# Patient Record
Sex: FEMALE | Race: BLACK OR AFRICAN AMERICAN | NOT HISPANIC OR LATINO | Employment: UNEMPLOYED | URBAN - METROPOLITAN AREA
[De-identification: names, ages, dates, MRNs, and addresses within clinical notes are randomized per-mention and may not be internally consistent; named-entity substitution may affect disease eponyms.]

---

## 2017-04-26 ENCOUNTER — RADIANT APPOINTMENT (OUTPATIENT)
Dept: MAMMOGRAPHY | Facility: CLINIC | Age: 62
End: 2017-04-26
Attending: FAMILY MEDICINE

## 2017-04-26 ENCOUNTER — ONCOLOGY VISIT (OUTPATIENT)
Dept: ONCOLOGY | Facility: CLINIC | Age: 62
End: 2017-04-26
Attending: FAMILY MEDICINE
Payer: COMMERCIAL

## 2017-04-26 VITALS
HEART RATE: 90 BPM | HEIGHT: 68 IN | OXYGEN SATURATION: 93 % | SYSTOLIC BLOOD PRESSURE: 138 MMHG | DIASTOLIC BLOOD PRESSURE: 76 MMHG | TEMPERATURE: 98.3 F | RESPIRATION RATE: 12 BRPM | WEIGHT: 259.1 LBS | BODY MASS INDEX: 39.27 KG/M2

## 2017-04-26 DIAGNOSIS — N64.4 BREAST PAIN: ICD-10-CM

## 2017-04-26 DIAGNOSIS — Z12.39 BREAST CANCER SCREENING: ICD-10-CM

## 2017-04-26 DIAGNOSIS — N64.4 PAIN OF BOTH BREASTS: Primary | ICD-10-CM

## 2017-04-26 PROCEDURE — 99212 OFFICE O/P EST SF 10 MIN: CPT | Mod: ZF

## 2017-04-26 RX ORDER — NICOTINE POLACRILEX 2 MG
GUM BUCCAL
COMMUNITY

## 2017-04-26 RX ORDER — EPINEPHRINE 0.3 MG/.3ML
0.3 INJECTION SUBCUTANEOUS
COMMUNITY
Start: 2015-09-29

## 2017-04-26 RX ORDER — OFLOXACIN 3 MG/ML
3-5 SOLUTION/ DROPS OPHTHALMIC
COMMUNITY
Start: 2016-09-07 | End: 2017-04-26

## 2017-04-26 RX ORDER — MAGNESIUM HYDROXIDE 1200 MG/15ML
LIQUID ORAL
COMMUNITY
Start: 2017-01-03

## 2017-04-26 RX ORDER — DIPHENHYDRAMINE HCL 25 MG
25 TABLET ORAL
COMMUNITY
Start: 2017-03-03

## 2017-04-26 RX ORDER — PREDNISONE 20 MG/1
TABLET ORAL
COMMUNITY
Start: 2017-03-03

## 2017-04-26 ASSESSMENT — PAIN SCALES - GENERAL: PAINLEVEL: NO PAIN (0)

## 2017-04-26 NOTE — MR AVS SNAPSHOT
"              After Visit Summary   4/26/2017    Bethanie Arcos    MRN: [id number]           Patient Information     Date Of Birth          1955        Visit Information        Provider Department      4/26/2017 8:40 AM Maggie Verma MD [facility]        Today's Diagnoses     Pain of both breasts    -  1    Breast cancer screening           Follow-ups after your visit        Future tests that were ordered for you today     Open Future Orders        Priority Expected Expires Ordered    US Breast Bilateral Limited 1-3 Quadrants Routine  4/25/2018 4/25/2017            Who to contact     If you have questions or need follow up information about today's clinic visit or your schedule please contact [facility] directly at [phone].  Normal or non-critical lab and imaging results will be communicated to you by MyChart, letter or phone within 4 business days after the clinic has received the results. If you do not hear from us within 7 days, please contact the clinic through [employer] or phone. If you have a critical or abnormal lab result, we will notify you by phone as soon as possible.  Submit refill requests through [employer] or call your pharmacy and they will forward the refill request to us. Please allow 3 business days for your refill to be completed.          Additional Information About Your Visit        MyChart Information     [employer] lets you send messages to your doctor, view your test results, renew your prescriptions, schedule appointments and more. To sign up, go to [email or web address] . Click on \"Log in\" on the left side of the screen, which will take you to the Welcome page. Then click on \"Sign up Now\" on the right side of the page.     You will be asked to enter the access code listed below, as well as some personal information. Please follow the directions to create your username and password.     Your access code is: [email or web address]  Expires: 7/11/2017  6:30 AM   " "  Your access code will  in 90 days. If you need help or a new code, please call your [facility] clinic or [phone].        Care EveryWhere ID     This is your Care EveryWhere ID. This could be used by other organizations to access your [facility] medical records  [id number]        Your Vitals Were     Pulse Temperature Respirations Height Last Period Pulse Oximetry    90 98.3  F (36.8  C) (Oral) 12 1.721 m (5' 7.76\") 2009 93%    BMI (Body Mass Index)                   39.68 kg/m2            Blood Pressure from Last 3 Encounters:   17 138/76   09 98/66   08 102/72    Weight from Last 3 Encounters:   17 117.5 kg (259 lb 1.6 oz)   09 100.7 kg (222 lb)   08 98 kg (216 lb)              Today, you had the following     No orders found for display       Primary Care Provider Office Phone # Fax #    Jailyn Celeste [phone]       [facility] [address]  [facility] [address]        Thank you!     Thank you for choosing [facility]  for your care. Our goal is always to provide you with excellent care. Hearing back from our patients is one way we can continue to improve our services. Please take a few minutes to complete the written survey that you may receive in the mail after your visit with us. Thank you!             Your Updated Medication List - Protect others around you: Learn how to safely use, store and throw away your medicines at www.disposemymeds.org.          This list is accurate as of: 17 10:04 AM.  Always use your most recent med list.                   Brand Name Dispense Instructions for use    Ascorbic Acid 1500 MG Tbcr      Take 1,500 mg by mouth       Biotin 1 MG Caps          CALCIUM PO      2000mg daily       diphenhydrAMINE 25 MG tablet    BENADRYL     Take 25 mg by mouth       EPIPEN 2-JESSICA 0.3 MG/0.3ML injection   Generic drug:  EPINEPHrine      Inject 0.3 mg into the muscle       Iron-Folic " Acid-C-B6-B12-Zinc 150-1.25 MG Tabs          mepolizumab 100 MG injection    NUCALA     Inject 100 mg Subcutaneous       mometasone-formoterol 200-5 MCG/ACT oral inhaler    DULERA     Inhale 2 puffs into the lungs       MULTIVITAMIN/MINERAL FORMULA Tabs     30    1 TABLET DAILY       NASONEX 50 MCG/ACT spray   Generic drug:  mometasone     3 MONTHS    2 sprays each nostril Once daily for allergies       nebulizer Shonna      Use q 2 hours as needed for asthma       predniSONE 20 MG tablet    DELTASONE     Emergency plan for Nucala       ranitidine 150 MG tablet    ZANTAC     Take 300 mg by mouth       sodium chloride 0.9% (bottle) 0.9 % irrigation      MIX 15ML WITH 1 VIAL OF BUDESONIDE AND IRRIGATE TWO TIMES A DAY AND REPEAT WITH OTHER NOSTRIL       UNABLE TO FIND          vitamin D 2000 UNITS tablet      1 tab daily

## 2017-04-26 NOTE — NURSING NOTE
"Bethanie rAcos is a 61 year old female who presents for:  Chief Complaint   Patient presents with     Oncology Clinic Visit     consult for breast and mamomo         Initial Vitals:  /76  Pulse 90  Temp 98.3  F (36.8  C) (Oral)  Resp 12  Ht 1.721 m (5' 7.76\")  Wt 117.5 kg (259 lb 1.6 oz)  LMP 04/30/2009  SpO2 93%  BMI 39.68 kg/m2 Estimated body mass index is 39.68 kg/(m^2) as calculated from the following:    Height as of this encounter: 1.721 m (5' 7.76\").    Weight as of this encounter: 117.5 kg (259 lb 1.6 oz).. Body surface area is 2.37 meters squared. BP completed using cuff size: large  No Pain (0) Patient's last menstrual period was 04/30/2009. Allergies and medications reviewed.     Medications: Medication refills not needed today.  Pharmacy name entered into [employer]:    [facility] - [place], [place] - [address]  [facility] [place], [place] - [address]    Comments: pt denies pain    5 minutes for nursing intake (face to face time)   Raeann Demarco CMA        "

## 2017-04-26 NOTE — PROGRESS NOTES
Bethanie is a 61 year old female who [facility] with breast [facility]:    Last mammogram 2.2016: negative    Sensitivity/Tenderness in both breasts.  Presents for six months. No localized tenderness. Discomfort is bilateral on the underside of both breasts.   HPI   Breast Cancer Risk Profile: Age: 61 year old, Gravity 2, Parity 2.  Age at first birth 35. Menarche 13.  LMP: age 60 - polypectomy 2015 then started menopause. Some Hot flashes. Previous Breast Biopsy # 0.  Personal History of Cancer No.  Family History; Breast cancer: maternal aunt age 60's. Maternal cousins 2-3 in 40's. Ovarian cancer negative; Colon Cancer negative.   - Started Nucala five months for asthma and has gained 20 pounds  - Doesn't think the breast pain is related to the new drug  - Is a heavy coffee drinker  - Seeing a naturopath/chiropractor and on an elimination diet  ROS  General:  None  Head/eyes:  None  Ears/Nose/Throat:  None  Breast:bilateral breast tenderness.   Cardiovascular:  None  Respiratory:  None  Gastrointestinal:  None  Genitourinary:  None  Sexual Function:  None  Musculoskeletal:  None  Skin:  None  Neurological:  None  Mental Health:  None  Endocrine:  None  Past Medical History:  Past Medical History:   Diagnosis Date     Adenomatous polyp of colon 9/07    one adenomatous polyp on colonoscopy     Allergic rhinitis      ASCUS on Pap smear 12/07    neg HPV     Enlarged thyroid gland     seen on chest CT 9/07     Intramural leiomyoma of uterus 5/07    multiple uterine myomata     Moderate persistent asthma      Multiple pulmonary nodules     seen on chest CT 9/07     Ovarian cyst 12/06    right ovarian cyst, f/u U/S normal     Papanicolaou smear of cervix with low grade squamous intraepithelial lesion (LGSIL) 5/07     Vitamin D deficiency 1/09   Past Surgical History:  Past Surgical History:   Procedure Laterality Date     HC COLONOSCOPY THRU STOMA, DIAGNOSTIC  9/4/07    Dr. Edwards, [place] [facility], 1 adenoma,  "rpt 5 yrs.     HC COLP CERVIX/UPPER VAGINA W BX CERVIX  6/07    Dr. Bautista, normal     STRESS ECHO (METRO)  4/20/06    WNL     SURGICAL HISTORY OF -   2003    sinus surgery   Medications:  Current Outpatient Prescriptions   Medication Sig Dispense Refill     sodium chloride 0.9%, bottle, 0.9 % irrigation MIX 15ML WITH 1 VIAL OF BUDESONIDE AND IRRIGATE TWO TIMES A DAY AND REPEAT WITH OTHER NOSTRIL       Respiratory Therapy Supplies (NEBULIZER) BLANCA Use q 2 hours as needed for asthma       ranitidine (ZANTAC) 150 MG tablet Take 300 mg by mouth       predniSONE (DELTASONE) 20 MG tablet Emergency plan for Nucala       mometasone-formoterol (DULERA) 200-5 MCG/ACT oral inhaler Inhale 2 puffs into the lungs       mepolizumab (NUCALA) 100 MG injection Inject 100 mg Subcutaneous       UNABLE TO FIND        Iron-Folic Acid-C-B6-B12-Zinc 150-1.25 MG TABS        EPINEPHrine (EPIPEN 2-JESSICA) 0.3 MG/0.3ML injection Inject 0.3 mg into the muscle       diphenhydrAMINE (BENADRYL) 25 MG tablet Take 25 mg by mouth       Biotin 1 MG CAPS        Ascorbic Acid (VITAMIN C CR) 1500 MG TBCR Take 1,500 mg by mouth       NASONEX 50 MCG/ACT NA SUSP 2 sprays each nostril Once daily for allergies 3 MONTHS 2     VITAMIN D 2000 UNIT OR TABS 1 tab daily       CALCIUM OR 2000mg daily       MULTIVITAMIN/MINERAL FORMULA OR TABS 1 TABLET DAILY 30 0   Social Hx:   [place] [facility]]  Vitals:   /76  Pulse 90  Temp 98.3  F (36.8  C) (Oral)  Resp 12  Ht 1.721 m (5' 7.76\")  Wt 117.5 kg (259 lb 1.6 oz)  LMP 04/30/2009  SpO2 93%  BMI 39.68 kg/m2  Physical Exam:  Constitutional: no distress  Pyschological: Alert/Oriented  Eyes: anicteric, normal extra-ocular movements  Breast: Examined in a sitting and lying position.  Symmetrical without visible distortion, swelling or rashes.  No nipple inversion, nipple discharge, breast dimpling or puckering.  Breast tissue is homogeneous dense to palpation - she does have a " prominent inferior breast ridge bilaterally that is minimally tender to palpate. No abnormal masses noted. Axillary area without masses or lympadenopathy.  Skin: no concerning lesions, no jaundice  Neurological: Normal gait, no tremor  RESULTS:    4.26.2017:  mammogram and US: negative  Diagnoses and associated orders for this visit:  Pain of both breasts/ Breast cancer screening  1) Reviewed images today with radiologist and no concerning findings.    2) Reassurance to patient that images and Breast Exam are normal  3) Continue with yearly screening mammogram and Clinical breast exam according to the ACS guidelines:    4) Discussed treatment for breast tenderness.

## 2017-04-27 ENCOUNTER — TELEPHONE (OUTPATIENT)
Dept: ONCOLOGY | Facility: CLINIC | Age: 62
End: 2017-04-27

## 2017-04-27 NOTE — TELEPHONE ENCOUNTER
Oncology Nutrition:  Reason for Contact:  Patient was contacted by phone due to reporting concerns about concern with her weight on the Oncology Distress Screening tool.   Action:  A voicemail message was left indicating the reason for the call and the number to use to contact us.   Plan: Will follow-up by calling the patient a second time if she does not contact me within 72 hours.    Herlinda Carranza RD, LD

## 2017-04-28 ENCOUNTER — TELEPHONE (OUTPATIENT)
Dept: ONCOLOGY | Facility: CLINIC | Age: 62
End: 2017-04-28

## 2017-04-28 ENCOUNTER — TELEPHONE (OUTPATIENT)
Dept: NUTRITION | Facility: CLINIC | Age: 62
End: 2017-04-28

## 2017-04-28 NOTE — TELEPHONE ENCOUNTER
Nutrition Brief Note:   Writer was informed by Herlinda Carranza (HANNAHN, LDN) that Bethanie made known her concerns with her weight and was interested in weight management without medications.  Writer called Pt and left a voicemail on how to schedule with one of our endocrinologists with weight management and RD back-to-back ([phone]).  Writer also left RD contact information if any questions arise.       Edita Godfrey MS, RDN, LDN, CLT  Pager: [phone]

## 2017-04-28 NOTE — TELEPHONE ENCOUNTER
Nutrition Services:     Received a call back from Bethanie today.   A nutrition referral was triggered from the Oncology Distress Screening tool - [facility] as she is 'concerned about her weight'.     Bethanie expresses that she is interested in seeing an RD for weight loss and prediabetes.  Writer provided her with options for RD services in the [facility].  After reviewing the options, she is not interested in seeing oncology RD, diabetes RD but would like to set up a visit with an RD from [facility] clinic.      Interventions:   Informed Bethanie that this RD would get her in touch with RD's from Weight [facility].   Sent message to Weight Management RD's regarding Bethanie's interest in seeing RD from their clinic.     Advised pt to call this RD if she does not here from staff in 1 week.    Provided Bethanie with contact information.     Herlinda Carranza RD, LD

## 2017-06-03 ENCOUNTER — HEALTH MAINTENANCE LETTER (OUTPATIENT)
Age: 62
End: 2017-06-03

## 2018-10-26 ENCOUNTER — RADIANT APPOINTMENT (OUTPATIENT)
Dept: MAMMOGRAPHY | Facility: CLINIC | Age: 63
End: 2018-10-26
Attending: FAMILY MEDICINE
Payer: COMMERCIAL

## 2018-10-26 DIAGNOSIS — Z12.31 SCREENING MAMMOGRAM, ENCOUNTER FOR: ICD-10-CM

## 2020-02-21 ENCOUNTER — ANCILLARY PROCEDURE (OUTPATIENT)
Dept: MAMMOGRAPHY | Facility: CLINIC | Age: 65
End: 2020-02-21
Attending: FAMILY MEDICINE
Payer: COMMERCIAL

## 2020-02-21 DIAGNOSIS — Z12.31 VISIT FOR SCREENING MAMMOGRAM: ICD-10-CM

## 2021-04-22 ENCOUNTER — ANCILLARY PROCEDURE (OUTPATIENT)
Dept: MAMMOGRAPHY | Facility: CLINIC | Age: 66
End: 2021-04-22
Payer: COMMERCIAL

## 2021-04-22 DIAGNOSIS — Z12.31 VISIT FOR SCREENING MAMMOGRAM: ICD-10-CM

## 2021-04-22 PROCEDURE — 77063 BREAST TOMOSYNTHESIS BI: CPT | Mod: GC

## 2021-04-22 PROCEDURE — 77067 SCR MAMMO BI INCL CAD: CPT | Mod: GC

## 2022-11-08 ENCOUNTER — ANCILLARY PROCEDURE (OUTPATIENT)
Dept: MAMMOGRAPHY | Facility: CLINIC | Age: 67
End: 2022-11-08
Attending: FAMILY MEDICINE
Payer: COMMERCIAL

## 2022-11-08 DIAGNOSIS — Z12.31 VISIT FOR SCREENING MAMMOGRAM: ICD-10-CM

## 2022-11-08 PROCEDURE — 77067 SCR MAMMO BI INCL CAD: CPT | Mod: GC | Performed by: RADIOLOGY

## 2022-11-08 PROCEDURE — 77063 BREAST TOMOSYNTHESIS BI: CPT | Mod: GC | Performed by: RADIOLOGY

## 2023-11-09 ENCOUNTER — ANCILLARY PROCEDURE (OUTPATIENT)
Dept: MAMMOGRAPHY | Facility: CLINIC | Age: 68
End: 2023-11-09
Attending: PHYSICIAN ASSISTANT
Payer: COMMERCIAL

## 2023-11-09 DIAGNOSIS — Z12.31 VISIT FOR SCREENING MAMMOGRAM: ICD-10-CM

## 2023-11-09 PROCEDURE — 77067 SCR MAMMO BI INCL CAD: CPT | Performed by: STUDENT IN AN ORGANIZED HEALTH CARE EDUCATION/TRAINING PROGRAM

## 2023-11-09 PROCEDURE — 77063 BREAST TOMOSYNTHESIS BI: CPT | Performed by: STUDENT IN AN ORGANIZED HEALTH CARE EDUCATION/TRAINING PROGRAM
